# Patient Record
Sex: FEMALE | Race: WHITE | Employment: FULL TIME | URBAN - METROPOLITAN AREA
[De-identification: names, ages, dates, MRNs, and addresses within clinical notes are randomized per-mention and may not be internally consistent; named-entity substitution may affect disease eponyms.]

---

## 2021-10-09 ENCOUNTER — HOSPITAL ENCOUNTER (EMERGENCY)
Age: 21
Discharge: HOME | End: 2021-10-09
Attending: EMERGENCY MEDICINE
Payer: COMMERCIAL

## 2021-10-09 ENCOUNTER — APPOINTMENT (OUTPATIENT)
Dept: ULTRASOUND IMAGING | Age: 21
End: 2021-10-09
Attending: NURSE PRACTITIONER
Payer: COMMERCIAL

## 2021-10-09 VITALS
RESPIRATION RATE: 16 BRPM | HEIGHT: 70 IN | SYSTOLIC BLOOD PRESSURE: 112 MMHG | OXYGEN SATURATION: 97 % | HEART RATE: 88 BPM | DIASTOLIC BLOOD PRESSURE: 68 MMHG | WEIGHT: 176 LBS | TEMPERATURE: 98 F | BODY MASS INDEX: 25.2 KG/M2

## 2021-10-09 DIAGNOSIS — O12.03 SWELLING OF LOWER EXTREMITY DURING PREGNANCY IN THIRD TRIMESTER: ICD-10-CM

## 2021-10-09 DIAGNOSIS — M54.31 SCIATICA OF RIGHT SIDE: Primary | ICD-10-CM

## 2021-10-09 PROCEDURE — 99284 EMERGENCY DEPT VISIT MOD MDM: CPT | Performed by: EMERGENCY MEDICINE

## 2021-10-09 PROCEDURE — 93971 EXTREMITY STUDY: CPT | Performed by: NURSE PRACTITIONER

## 2021-10-09 RX ORDER — PRENATAL VIT/IRON FUM/FOLIC AC 27MG-0.8MG
1 TABLET ORAL DAILY
COMMUNITY

## 2021-10-09 NOTE — ED INITIAL ASSESSMENT (HPI)
Pt states one week of right thigh numbness and right sciatic pain, today noted spider veins to right upper calf, sent here for eval from OB, 31 weeks preg,  Denies abd pain or vaginal bleeding

## 2021-10-09 NOTE — ED PROVIDER NOTES
Patient Seen in: [facility] Emergency Department In [place]      History   Patient presents with:  Deep Vein Thrombosis    Stated Complaint: NUMBNESS RIGHT LEG TO KNEE. \"THE VEIN IS REALLY WEIRD. \" SENT BY HER OB TO CHECK*    Subjective:    This is a 21-y for rash. Allergic/Immunologic: Negative for environmental allergies. Neurological: Positive for numbness. Negative for syncope, weakness and headaches. Hematological: Does not bruise/bleed easily.        Positive for stated complaint: NUMBNESS RIGHT refill takes less than 2 seconds. Findings: No rash. Neurological:      Mental Status: She is alert and oriented to person, place, and time.    Psychiatric:         Mood and Affect: Mood normal.         Behavior: Behavior normal.               ED Cou

## 2021-11-17 ENCOUNTER — APPOINTMENT (OUTPATIENT)
Dept: ULTRASOUND IMAGING | Facility: HOSPITAL | Age: 21
End: 2021-11-17
Attending: OBSTETRICS & GYNECOLOGY
Payer: COMMERCIAL

## 2021-11-17 ENCOUNTER — HOSPITAL ENCOUNTER (OUTPATIENT)
Facility: HOSPITAL | Age: 21
Setting detail: OBSERVATION
Discharge: HOME | End: 2021-11-17
Attending: OBSTETRICS & GYNECOLOGY | Admitting: OBSTETRICS & GYNECOLOGY
Payer: COMMERCIAL

## 2021-11-17 VITALS
HEART RATE: 96 BPM | SYSTOLIC BLOOD PRESSURE: 110 MMHG | HEIGHT: 70 IN | BODY MASS INDEX: 26.48 KG/M2 | RESPIRATION RATE: 16 BRPM | WEIGHT: 185 LBS | DIASTOLIC BLOOD PRESSURE: 75 MMHG

## 2021-11-17 PROBLEM — Z34.90 PREGNANCY: Status: ACTIVE | Noted: 2021-11-17

## 2021-11-17 PROBLEM — Z34.90 PREGNANCY (HCC): Status: ACTIVE | Noted: 2021-11-17

## 2021-11-17 PROCEDURE — 59025 FETAL NON-STRESS TEST: CPT

## 2021-11-17 PROCEDURE — 76815 OB US LIMITED FETUS(S): CPT | Performed by: OBSTETRICS & GYNECOLOGY

## 2021-11-17 PROCEDURE — 99214 OFFICE O/P EST MOD 30 MIN: CPT

## 2021-11-17 RX ORDER — MELATONIN
325
COMMUNITY

## 2021-11-17 NOTE — PROGRESS NOTES
Pt is a 19year old female admitted to TRG4/TRG4-A. Pt is  36w2d intra-uterine pregnancy. Patient presents with:  R/o Rom  Pt states she had a regularly schedule OB appt this afternoon w/ [facility].  Upon arriving home and using the restroom she noticed s

## 2021-11-17 NOTE — NST
nstNonstress Test   Patient: Arturo Lamas    Gestation: 36w2d    NST:       Variability: Moderate           Accelerations: Yes           Decelerations: None            Baseline: 135 BPM           Uterine Irritability: No           Contractions: Irregu

## 2021-12-19 ENCOUNTER — HOSPITAL ENCOUNTER (OUTPATIENT)
Facility: HOSPITAL | Age: 21
Setting detail: OBSERVATION
Discharge: HOME | End: 2021-12-19
Attending: STUDENT IN AN ORGANIZED HEALTH CARE EDUCATION/TRAINING PROGRAM | Admitting: STUDENT IN AN ORGANIZED HEALTH CARE EDUCATION/TRAINING PROGRAM
Payer: COMMERCIAL

## 2021-12-19 VITALS
HEIGHT: 70 IN | TEMPERATURE: 97 F | RESPIRATION RATE: 16 BRPM | BODY MASS INDEX: 27.92 KG/M2 | WEIGHT: 195 LBS | SYSTOLIC BLOOD PRESSURE: 120 MMHG | DIASTOLIC BLOOD PRESSURE: 78 MMHG | HEART RATE: 89 BPM

## 2021-12-19 PROCEDURE — 4A0HXCZ MEASUREMENT OF PRODUCTS OF CONCEPTION, CARDIAC RATE, EXTERNAL APPROACH: ICD-10-PCS | Performed by: STUDENT IN AN ORGANIZED HEALTH CARE EDUCATION/TRAINING PROGRAM

## 2021-12-19 PROCEDURE — 59025 FETAL NON-STRESS TEST: CPT

## 2021-12-19 PROCEDURE — 99214 OFFICE O/P EST MOD 30 MIN: CPT

## 2021-12-20 ENCOUNTER — HOSPITAL ENCOUNTER (INPATIENT)
Facility: HOSPITAL | Age: 21
LOS: 3 days | Discharge: HOME | End: 2021-12-23
Attending: OBSTETRICS & GYNECOLOGY | Admitting: OBSTETRICS & GYNECOLOGY
Payer: COMMERCIAL

## 2021-12-20 ENCOUNTER — APPOINTMENT (OUTPATIENT)
Dept: OBGYN CLINIC | Facility: HOSPITAL | Age: 21
End: 2021-12-20
Payer: COMMERCIAL

## 2021-12-20 PROCEDURE — 85025 COMPLETE CBC W/AUTO DIFF WBC: CPT | Performed by: OBSTETRICS & GYNECOLOGY

## 2021-12-20 PROCEDURE — 86901 BLOOD TYPING SEROLOGIC RH(D): CPT | Performed by: OBSTETRICS & GYNECOLOGY

## 2021-12-20 PROCEDURE — 3E0P7GC INTRODUCTION OF OTHER THERAPEUTIC SUBSTANCE INTO FEMALE REPRODUCTIVE, VIA NATURAL OR ARTIFICIAL OPENING: ICD-10-PCS | Performed by: STUDENT IN AN ORGANIZED HEALTH CARE EDUCATION/TRAINING PROGRAM

## 2021-12-20 PROCEDURE — 82728 ASSAY OF FERRITIN: CPT | Performed by: STUDENT IN AN ORGANIZED HEALTH CARE EDUCATION/TRAINING PROGRAM

## 2021-12-20 PROCEDURE — 86850 RBC ANTIBODY SCREEN: CPT | Performed by: OBSTETRICS & GYNECOLOGY

## 2021-12-20 PROCEDURE — 86780 TREPONEMA PALLIDUM: CPT | Performed by: OBSTETRICS & GYNECOLOGY

## 2021-12-20 PROCEDURE — 86900 BLOOD TYPING SEROLOGIC ABO: CPT | Performed by: OBSTETRICS & GYNECOLOGY

## 2021-12-20 RX ORDER — ZOLPIDEM TARTRATE 5 MG/1
5 TABLET ORAL NIGHTLY PRN
Status: DISCONTINUED | OUTPATIENT
Start: 2021-12-20 | End: 2021-12-21

## 2021-12-20 RX ORDER — TERBUTALINE SULFATE 1 MG/ML
0.25 INJECTION, SOLUTION SUBCUTANEOUS AS NEEDED
Status: DISCONTINUED | OUTPATIENT
Start: 2021-12-20 | End: 2021-12-21

## 2021-12-20 RX ORDER — ONDANSETRON 2 MG/ML
4 INJECTION INTRAMUSCULAR; INTRAVENOUS EVERY 6 HOURS PRN
Status: DISCONTINUED | OUTPATIENT
Start: 2021-12-20 | End: 2021-12-21

## 2021-12-20 RX ORDER — IBUPROFEN 600 MG/1
600 TABLET ORAL EVERY 6 HOURS PRN
Status: DISCONTINUED | OUTPATIENT
Start: 2021-12-20 | End: 2021-12-21

## 2021-12-20 RX ORDER — SODIUM CHLORIDE, SODIUM LACTATE, POTASSIUM CHLORIDE, CALCIUM CHLORIDE 600; 310; 30; 20 MG/100ML; MG/100ML; MG/100ML; MG/100ML
INJECTION, SOLUTION INTRAVENOUS CONTINUOUS
Status: DISCONTINUED | OUTPATIENT
Start: 2021-12-20 | End: 2021-12-21

## 2021-12-20 RX ORDER — TRISODIUM CITRATE DIHYDRATE AND CITRIC ACID MONOHYDRATE 500; 334 MG/5ML; MG/5ML
30 SOLUTION ORAL AS NEEDED
Status: DISCONTINUED | OUTPATIENT
Start: 2021-12-20 | End: 2021-12-21

## 2021-12-20 RX ORDER — ACETAMINOPHEN 500 MG
500 TABLET ORAL EVERY 6 HOURS PRN
Status: DISCONTINUED | OUTPATIENT
Start: 2021-12-20 | End: 2021-12-21

## 2021-12-20 RX ORDER — AMMONIA INHALANTS 0.04 G/.3ML
0.3 INHALANT RESPIRATORY (INHALATION) AS NEEDED
Status: DISCONTINUED | OUTPATIENT
Start: 2021-12-20 | End: 2021-12-21

## 2021-12-20 RX ORDER — DEXTROSE, SODIUM CHLORIDE, SODIUM LACTATE, POTASSIUM CHLORIDE, AND CALCIUM CHLORIDE 5; .6; .31; .03; .02 G/100ML; G/100ML; G/100ML; G/100ML; G/100ML
INJECTION, SOLUTION INTRAVENOUS AS NEEDED
Status: DISCONTINUED | OUTPATIENT
Start: 2021-12-20 | End: 2021-12-21

## 2021-12-20 NOTE — PROGRESS NOTES
POC discussed with pt, pt verb understanding and agreeable. Pt requesting to walk halls.  Pt up to ambulate

## 2021-12-20 NOTE — NST
Nonstress Test   Patient: Abida Gomez    Gestation: 40w6d    NST:       Variability: Moderate           Accelerations: Yes           Decelerations: None            Baseline: 135 BPM           Uterine Irritability: No           Contractions: Irreg

## 2021-12-20 NOTE — PROGRESS NOTES
Pt  EDC 19 presents to L&D with c/o irregular cramping throughout the day, pt states possible leaking of clear fluid since yesterday apx 1100. Pt denies vag bleeding, states + fetal movement. EFM tested and applied.  Pt labels verified per pt and

## 2021-12-20 NOTE — PROGRESS NOTES
Discharge instructions given to pt and discussed. Pt states no questions at this time, verb understanding and agreeable to POC. Pt refused wheelchair, escorted out by this RN with instructions in hand, family at side in stable condition.

## 2021-12-21 ENCOUNTER — ANESTHESIA EVENT (OUTPATIENT)
Dept: OBGYN UNIT | Facility: HOSPITAL | Age: 21
End: 2021-12-21
Payer: COMMERCIAL

## 2021-12-21 ENCOUNTER — ANESTHESIA (OUTPATIENT)
Dept: OBGYN UNIT | Facility: HOSPITAL | Age: 21
End: 2021-12-21
Payer: COMMERCIAL

## 2021-12-21 PROCEDURE — 0KQM0ZZ REPAIR PERINEUM MUSCLE, OPEN APPROACH: ICD-10-PCS | Performed by: STUDENT IN AN ORGANIZED HEALTH CARE EDUCATION/TRAINING PROGRAM

## 2021-12-21 RX ORDER — METHYLERGONOVINE MALEATE 0.2 MG/ML
INJECTION INTRAVENOUS
Status: COMPLETED
Start: 2021-12-21 | End: 2021-12-21

## 2021-12-21 RX ORDER — NALBUPHINE HCL 10 MG/ML
2.5 AMPUL (ML) INJECTION
Status: DISCONTINUED | OUTPATIENT
Start: 2021-12-21 | End: 2021-12-21

## 2021-12-21 RX ORDER — METHYLERGONOVINE MALEATE 0.2 MG/ML
0.2 INJECTION INTRAVENOUS ONCE
Status: COMPLETED | OUTPATIENT
Start: 2021-12-21 | End: 2021-12-21

## 2021-12-21 RX ORDER — IBUPROFEN 600 MG/1
600 TABLET ORAL EVERY 6 HOURS
Status: DISCONTINUED | OUTPATIENT
Start: 2021-12-21 | End: 2021-12-23

## 2021-12-21 RX ORDER — CARBOPROST TROMETHAMINE 250 UG/ML
INJECTION, SOLUTION INTRAMUSCULAR
Status: COMPLETED
Start: 2021-12-21 | End: 2021-12-21

## 2021-12-21 RX ORDER — DOCUSATE SODIUM 100 MG/1
100 CAPSULE, LIQUID FILLED ORAL
Status: DISCONTINUED | OUTPATIENT
Start: 2021-12-21 | End: 2021-12-23

## 2021-12-21 RX ORDER — AMPICILLIN 2 G/1
INJECTION, POWDER, FOR SOLUTION INTRAVENOUS
Status: COMPLETED
Start: 2021-12-21 | End: 2021-12-21

## 2021-12-21 RX ORDER — BISACODYL 10 MG
10 SUPPOSITORY, RECTAL RECTAL ONCE AS NEEDED
Status: DISCONTINUED | OUTPATIENT
Start: 2021-12-21 | End: 2021-12-23

## 2021-12-21 RX ORDER — SIMETHICONE 80 MG
80 TABLET,CHEWABLE ORAL 3 TIMES DAILY PRN
Status: DISCONTINUED | OUTPATIENT
Start: 2021-12-21 | End: 2021-12-23

## 2021-12-21 RX ORDER — ACETAMINOPHEN 325 MG/1
650 TABLET ORAL EVERY 6 HOURS PRN
Status: DISCONTINUED | OUTPATIENT
Start: 2021-12-21 | End: 2021-12-23

## 2021-12-21 RX ORDER — 0.9 % SODIUM CHLORIDE 0.9 %
INTRAVENOUS SOLUTION INTRAVENOUS
Status: COMPLETED
Start: 2021-12-21 | End: 2021-12-21

## 2021-12-21 RX ORDER — BUPIVACAINE HCL/0.9 % NACL/PF 0.25 %
5 PLASTIC BAG, INJECTION (ML) EPIDURAL AS NEEDED
Status: DISCONTINUED | OUTPATIENT
Start: 2021-12-21 | End: 2021-12-21

## 2021-12-21 RX ORDER — AMPICILLIN 1 G/1
INJECTION, POWDER, FOR SOLUTION INTRAMUSCULAR; INTRAVENOUS
Status: DISPENSED
Start: 2021-12-21 | End: 2021-12-21

## 2021-12-21 RX ORDER — CARBOPROST TROMETHAMINE 250 UG/ML
250 INJECTION, SOLUTION INTRAMUSCULAR ONCE
Status: COMPLETED | OUTPATIENT
Start: 2021-12-21 | End: 2021-12-21

## 2021-12-21 NOTE — ANESTHESIA PROCEDURE NOTES
Labor Analgesia  Performed by: Jared Mcgrath MD  Authorized by: Jared Mcgrath MD       General Information and Staff    Start Time:  12/21/2021 7:53 AM  End Time:  12/21/2021 8:00 AM  Anesthesiologist:  Jared Mcgrath MD  Performed by:   Anesthesiologist  Pa

## 2021-12-21 NOTE — L&D DELIVERY NOTE
Edita Henry [[id number]]    Labor Events     labor?: No   steroids?: None  Cervical ripening type: Misoprostol  Antibiotics received during labor?: Yes  Antibiotics (enter # doses in comment): ampicillin  Rupture date/time: 2021 9484 pale Acrocyanotic Completely pink    Heart rate Absent <100 bpm >100 bpm    Reflex irritability No response Grimace Cry or active withdrawal    Muscle tone Limp Some flexion Active motion    Respiratory effort Absent Weak cry; hypoventilation Good, crying

## 2021-12-21 NOTE — PROGRESS NOTES
Pt is a 19year old female admitted to 108/108-A. Patient presents with:  Scheduled Induction     Pt is  41w0d intra-uterine pregnancy. History obtained, consents signed. Oriented to room, staff, and plan of care.

## 2021-12-21 NOTE — ANESTHESIA PREPROCEDURE EVALUATION
PRE-OP EVALUATION    [facility]    Admit Diagnosis: preg state  Pregnancy    Pre-op Diagnosis: * No pre-op diagnosis entered *        Anesthesia Procedure: LABOR ANALGESIA    * No surgeons found in log *    Pre-op vitals reviewed. Intravenous, Q4H        Outpatient Medications:   ferrous sulfate 325 (65 FE) MG Oral Tab EC, Take 325 mg by mouth daily with breakfast., Disp: , Rfl: , Past Month at Unknown time  Prenatal 27-0.8 MG Oral Tab, Take 1 tablet by mouth daily. , Disp: , Rfl: , on Admission:  **None**

## 2021-12-21 NOTE — PROGRESS NOTES
Pt admitted to room 2210 in stable condition. IV in place. ID bands matched with Angelo Gordon. Educational materials reviewed and placed at bedside. All questions answered. Call light within reach. Pt comfortable and resting.

## 2021-12-21 NOTE — H&P
[facility]  Obstetrics Admission History & Physical    Nicolle Deep Patient Status:  Inpatient    2000 MRN [id number]   [facility] 2SW-J Attending Ximena Brooke Day # 1 PCP No primary care provider on f Smoking status: Never Smoker      Smokeless tobacco: Never Used    Alcohol use: Never      FAMILY HISTORY:   Family History   Problem Relation Age of Onset   • Cancer Mother 48        Hodgkin's Lymphoma        GYNECOLOGICAL HISTORY:        Menarche 9yo.  [place] Phillip

## 2021-12-21 NOTE — PROCEDURES
Vaginal Delivery Note          [facility] Patient Status:  Inpatient    2000 MRN [id number]   Location [address] [facility] [address], [facility] [place] 647 Day stop pushing, and shoulder dystocia was called. The emergency light was pressed for additional staff to enter the room. Manual exam was performed to the posterior arm, and there was felt to be adequate room.  The maternal legs were in McRobert's position, a

## 2021-12-21 NOTE — ANESTHESIA PROCEDURE NOTES
Labor Analgesia  Performed by: Ashu Abarca MD  Authorized by: Ashu Abarca MD       General Information and Staff    Start Time:  12/21/2021 7:16 AM  End Time:  12/21/2021 7:24 AM  Anesthesiologist:  Ashu Abarca MD  Performed by:   Anesthesiologist  Pa

## 2021-12-22 PROCEDURE — 85025 COMPLETE CBC W/AUTO DIFF WBC: CPT | Performed by: STUDENT IN AN ORGANIZED HEALTH CARE EDUCATION/TRAINING PROGRAM

## 2021-12-22 NOTE — CM/SW NOTE
met with [place] ( patient) and Tiffany Leo (FOB) to review insurance for infant. Patient has her parents insurance so infant will be added to [employer] [facility] PPO plan. Couple needed list of PCP for infant from Jas[employer]'s insurance plan.

## 2021-12-22 NOTE — PROGRESS NOTES
[facility] Vaginal Delivery Progress Note    Grace Medeiros Patient Status:  Inpatient    2000 MRN [id number]   [facility] 2SW-J Attending Braden Garcia Day # 2 PCP No primary care provider o

## 2021-12-22 NOTE — PROGRESS NOTES
Labor Analgesia Follow Up Note    Patient underwent epidural anesthesia for labor analgesia,    Placenta Date/Time: 12/21/2021 11:38 AM    Delivery Date/Time[de-identified] 12/21/2021  11:29 AM    /57 (BP Location: Left arm)   Pulse 85   Temp 97.9 °F (36.6 °C) (O

## 2021-12-23 VITALS
RESPIRATION RATE: 18 BRPM | SYSTOLIC BLOOD PRESSURE: 114 MMHG | TEMPERATURE: 98 F | HEART RATE: 79 BPM | DIASTOLIC BLOOD PRESSURE: 76 MMHG

## 2021-12-23 PROBLEM — Z34.90 PREGNANCY: Status: RESOLVED | Noted: 2021-11-17 | Resolved: 2021-12-23

## 2021-12-23 PROBLEM — Z34.90 PREGNANCY (HCC): Status: RESOLVED | Noted: 2021-11-17 | Resolved: 2021-12-23

## 2021-12-23 NOTE — DISCHARGE SUMMARY
Obstetrical Discharge Summary    Patient Name:  Puma Blanco  MRN:                 [id number]  YOB: 2000    Primary OB Clinician: Leeann Chowdhury    Admission Date: 12/20/2021    Discharge Date:  12/23/2021    Admissi NEPRELIM 5.45 11.22*   WBC 7.8 13.4*   .0 127.0*         Discharge condition:  D/C'ed home in stable condition    Discharge diet:  general    Discharge medications:       Medication List      CONTINUE taking these medications    ferrous sulfate 32

## 2021-12-23 NOTE — PROGRESS NOTES
[address] [facility] Vaginal Delivery Progress Note    Chacho Lee Patient Status:  Inpatient    2000 MRN [id number]   [facility] 2SW-J Attending Jacquie Brewster Day # 3 PCP No primary care provider o

## 2021-12-23 NOTE — PROGRESS NOTES
Half dose IV venofer given, IV infiltrated during administratiopn.    IV removed  Cold compress applied

## 2021-12-26 ENCOUNTER — TELEPHONE (OUTPATIENT)
Dept: OBGYN UNIT | Facility: HOSPITAL | Age: 21
End: 2021-12-26

## 2021-12-28 ENCOUNTER — TELEPHONE (OUTPATIENT)
Dept: OBGYN UNIT | Facility: HOSPITAL | Age: 21
End: 2021-12-28

## 2021-12-28 NOTE — PROGRESS NOTES
12/28/21 1427   Cradle Call Follow up   Cradle call follow up date 12/28/21   Follow up needed Completed   Hypertension or Preeclampsia during pregnancy or delivery No   Outgoing Cradle Call completed: Mom reports that she and infant are doing well.

## 2022-05-10 ENCOUNTER — OFFICE VISIT (OUTPATIENT)
Dept: INTERNAL MEDICINE | Age: 22
End: 2022-05-10

## 2022-05-10 ENCOUNTER — TELEPHONE (OUTPATIENT)
Dept: FAMILY MEDICINE | Age: 22
End: 2022-05-10

## 2022-05-10 VITALS
RESPIRATION RATE: 16 BRPM | WEIGHT: 148 LBS | SYSTOLIC BLOOD PRESSURE: 90 MMHG | HEART RATE: 84 BPM | HEIGHT: 70 IN | DIASTOLIC BLOOD PRESSURE: 74 MMHG | BODY MASS INDEX: 21.19 KG/M2 | TEMPERATURE: 97.8 F

## 2022-05-10 DIAGNOSIS — J06.9 UPPER RESPIRATORY TRACT INFECTION, UNSPECIFIED TYPE: Primary | ICD-10-CM

## 2022-05-10 PROCEDURE — U0003 INFECTIOUS AGENT DETECTION BY NUCLEIC ACID (DNA OR RNA); SEVERE ACUTE RESPIRATORY SYNDROME CORONAVIRUS 2 (SARS-COV-2) (CORONAVIRUS DISEASE [COVID-19]), AMPLIFIED PROBE TECHNIQUE, MAKING USE OF HIGH THROUGHPUT TECHNOLOGIES AS DESCRIBED BY CMS-2020-01-R: HCPCS | Performed by: INTERNAL MEDICINE

## 2022-05-10 PROCEDURE — PSEU10635 2019 NOVEL CORONAVIRUS (SARS-COV-2): Performed by: CLINICAL MEDICAL LABORATORY

## 2022-05-10 PROCEDURE — U0005 INFEC AGEN DETEC AMPLI PROBE: HCPCS | Performed by: CLINICAL MEDICAL LABORATORY

## 2022-05-10 PROCEDURE — U0003 INFECTIOUS AGENT DETECTION BY NUCLEIC ACID (DNA OR RNA); SEVERE ACUTE RESPIRATORY SYNDROME CORONAVIRUS 2 (SARS-COV-2) (CORONAVIRUS DISEASE [COVID-19]), AMPLIFIED PROBE TECHNIQUE, MAKING USE OF HIGH THROUGHPUT TECHNOLOGIES AS DESCRIBED BY CMS-2020-01-R: HCPCS | Performed by: CLINICAL MEDICAL LABORATORY

## 2022-05-10 PROCEDURE — U0005 INFEC AGEN DETEC AMPLI PROBE: HCPCS | Performed by: INTERNAL MEDICINE

## 2022-05-10 PROCEDURE — 99203 OFFICE O/P NEW LOW 30 MIN: CPT | Performed by: INTERNAL MEDICINE

## 2022-05-10 RX ORDER — ACETAMINOPHEN AND CODEINE PHOSPHATE 120; 12 MG/5ML; MG/5ML
1 SOLUTION ORAL DAILY
COMMUNITY
Start: 2022-03-15

## 2022-05-10 RX ORDER — AMOXICILLIN 500 MG/1
500 CAPSULE ORAL 3 TIMES DAILY
Qty: 30 CAPSULE | Refills: 0 | Status: SHIPPED | OUTPATIENT
Start: 2022-05-10

## 2022-05-10 ASSESSMENT — PATIENT HEALTH QUESTIONNAIRE - PHQ9
CLINICAL INTERPRETATION OF PHQ2 SCORE: NO FURTHER SCREENING NEEDED
SUM OF ALL RESPONSES TO PHQ9 QUESTIONS 1 AND 2: 0
2. FEELING DOWN, DEPRESSED OR HOPELESS: NOT AT ALL
1. LITTLE INTEREST OR PLEASURE IN DOING THINGS: NOT AT ALL
SUM OF ALL RESPONSES TO PHQ9 QUESTIONS 1 AND 2: 0

## 2022-05-10 ASSESSMENT — PAIN SCALES - GENERAL: PAINLEVEL: 0

## 2022-05-11 ENCOUNTER — TELEPHONE (OUTPATIENT)
Dept: INTERNAL MEDICINE | Age: 22
End: 2022-05-11

## 2022-05-11 ENCOUNTER — LAB REQUISITION (OUTPATIENT)
Dept: LAB | Age: 22
End: 2022-05-11

## 2022-05-11 DIAGNOSIS — J06.9 ACUTE UPPER RESPIRATORY INFECTION, UNSPECIFIED: ICD-10-CM

## 2022-05-11 LAB
SARS-COV-2 RNA RESP QL NAA+PROBE: NOT DETECTED
SARS-COV-2 RNA RESP QL NAA+PROBE: NOT DETECTED
SERVICE CMNT-IMP: NORMAL

## 2023-07-11 LAB — HIV RESULT OB: NEGATIVE

## 2023-07-12 LAB — HEPATITIS B SURFACE ANTIGEN OB: NEGATIVE

## 2023-12-03 ENCOUNTER — HOSPITAL ENCOUNTER (OUTPATIENT)
Age: 23
Discharge: HOME | End: 2023-12-03
Payer: COMMERCIAL

## 2023-12-03 VITALS
OXYGEN SATURATION: 98 % | HEART RATE: 95 BPM | RESPIRATION RATE: 16 BRPM | TEMPERATURE: 98 F | SYSTOLIC BLOOD PRESSURE: 106 MMHG | DIASTOLIC BLOOD PRESSURE: 61 MMHG

## 2023-12-03 DIAGNOSIS — J06.9 VIRAL UPPER RESPIRATORY TRACT INFECTION: Primary | ICD-10-CM

## 2023-12-03 RX ORDER — ACETAMINOPHEN 325 MG/1
650 TABLET ORAL ONCE
Status: COMPLETED | OUTPATIENT
Start: 2023-12-03 | End: 2023-12-03

## 2023-12-03 NOTE — ED INITIAL ASSESSMENT (HPI)
Tues Pt started with chest congestion, productive cough, runny nose, slight nausea    Denies: fevers

## 2023-12-13 LAB — HIV RESULT OB: NEGATIVE

## 2023-12-26 ENCOUNTER — HOSPITAL ENCOUNTER (OUTPATIENT)
Age: 23
Discharge: HOME | End: 2023-12-26
Payer: COMMERCIAL

## 2023-12-26 VITALS
HEART RATE: 110 BPM | DIASTOLIC BLOOD PRESSURE: 80 MMHG | OXYGEN SATURATION: 96 % | BODY MASS INDEX: 24.14 KG/M2 | SYSTOLIC BLOOD PRESSURE: 117 MMHG | WEIGHT: 163 LBS | RESPIRATION RATE: 16 BRPM | TEMPERATURE: 98 F | HEIGHT: 69 IN

## 2023-12-26 DIAGNOSIS — H65.03 NON-RECURRENT ACUTE SEROUS OTITIS MEDIA OF BOTH EARS: Primary | ICD-10-CM

## 2023-12-26 PROCEDURE — 99203 OFFICE O/P NEW LOW 30 MIN: CPT | Performed by: NURSE PRACTITIONER

## 2023-12-26 RX ORDER — AMOXICILLIN 875 MG/1
875 TABLET, COATED ORAL 2 TIMES DAILY
Qty: 20 TABLET | Refills: 0 | Status: SHIPPED | OUTPATIENT
Start: 2023-12-26 | End: 2024-01-05

## 2023-12-26 NOTE — DISCHARGE INSTRUCTIONS
Follow-up with your primary care physician for all of your healthcare needs  Increase fluids keep hydrated  Tylenol for fever and pain  Take the amoxicillin twice daily for 10 days  Over-the-counter Delsym or Robitussin can be helpful for your cough while pregnant  Over-the-counter Flonase daily can also be helpful  Return to the emergency room for symptoms or concerns

## 2023-12-26 NOTE — ED INITIAL ASSESSMENT (HPI)
Pt with c/o uri symptoms for the past 3 weeks. Pt states symptoms resolved briefly but then returned. Pt c/o productive cough and intermittent shortness of breath. Pt c/o fatigue.   Pt is 30 weeks pregnant

## 2024-02-05 LAB — STREP GP B CULT OB: NEGATIVE

## 2024-02-27 ENCOUNTER — ANESTHESIA EVENT (OUTPATIENT)
Dept: OBGYN UNIT | Facility: HOSPITAL | Age: 24
End: 2024-02-27
Payer: COMMERCIAL

## 2024-02-27 ENCOUNTER — TELEPHONE (OUTPATIENT)
Dept: OBGYN UNIT | Facility: HOSPITAL | Age: 24
End: 2024-02-27

## 2024-02-27 ENCOUNTER — HOSPITAL ENCOUNTER (INPATIENT)
Facility: HOSPITAL | Age: 24
LOS: 2 days | Discharge: HOME | End: 2024-02-29
Attending: OBSTETRICS & GYNECOLOGY | Admitting: OBSTETRICS & GYNECOLOGY
Payer: COMMERCIAL

## 2024-02-27 ENCOUNTER — ANESTHESIA (OUTPATIENT)
Dept: OBGYN UNIT | Facility: HOSPITAL | Age: 24
End: 2024-02-27
Payer: COMMERCIAL

## 2024-02-27 PROBLEM — Z34.90 PREGNANCY (HCC): Status: ACTIVE | Noted: 2024-02-27

## 2024-02-27 LAB
ANTIBODY SCREEN: NEGATIVE
BASOPHILS # BLD AUTO: 0.03 X10(3) UL (ref 0–0.2)
BASOPHILS NFR BLD AUTO: 0.4 %
EOSINOPHIL # BLD AUTO: 0.03 X10(3) UL (ref 0–0.7)
EOSINOPHIL NFR BLD AUTO: 0.4 %
ERYTHROCYTE [DISTWIDTH] IN BLOOD BY AUTOMATED COUNT: 13.6 %
HCT VFR BLD AUTO: 36.1 %
HGB BLD-MCNC: 12 G/DL
IMM GRANULOCYTES # BLD AUTO: 0.09 X10(3) UL (ref 0–1)
IMM GRANULOCYTES NFR BLD: 1.1 %
LYMPHOCYTES # BLD AUTO: 1.73 X10(3) UL (ref 1–4)
LYMPHOCYTES NFR BLD AUTO: 20.6 %
MCH RBC QN AUTO: 28.4 PG (ref 26–34)
MCHC RBC AUTO-ENTMCNC: 33.2 G/DL (ref 31–37)
MCV RBC AUTO: 85.5 FL
MONOCYTES # BLD AUTO: 0.57 X10(3) UL (ref 0.1–1)
MONOCYTES NFR BLD AUTO: 6.8 %
NEUTROPHILS # BLD AUTO: 5.95 X10 (3) UL (ref 1.5–7.7)
NEUTROPHILS # BLD AUTO: 5.95 X10(3) UL (ref 1.5–7.7)
NEUTROPHILS NFR BLD AUTO: 70.7 %
PLATELET # BLD AUTO: 176 10(3)UL (ref 150–450)
RBC # BLD AUTO: 4.22 X10(6)UL
RH BLOOD TYPE: POSITIVE
T PALLIDUM AB SER QL IA: NONREACTIVE
WBC # BLD AUTO: 8.4 X10(3) UL (ref 4–11)

## 2024-02-27 PROCEDURE — 86780 TREPONEMA PALLIDUM: CPT | Performed by: OBSTETRICS & GYNECOLOGY

## 2024-02-27 PROCEDURE — 86900 BLOOD TYPING SEROLOGIC ABO: CPT | Performed by: OBSTETRICS & GYNECOLOGY

## 2024-02-27 PROCEDURE — 0HQ9XZZ REPAIR PERINEUM SKIN, EXTERNAL APPROACH: ICD-10-PCS | Performed by: OBSTETRICS & GYNECOLOGY

## 2024-02-27 PROCEDURE — 99214 OFFICE O/P EST MOD 30 MIN: CPT

## 2024-02-27 PROCEDURE — 85025 COMPLETE CBC W/AUTO DIFF WBC: CPT | Performed by: OBSTETRICS & GYNECOLOGY

## 2024-02-27 PROCEDURE — 86850 RBC ANTIBODY SCREEN: CPT | Performed by: OBSTETRICS & GYNECOLOGY

## 2024-02-27 PROCEDURE — 86901 BLOOD TYPING SEROLOGIC RH(D): CPT | Performed by: OBSTETRICS & GYNECOLOGY

## 2024-02-27 RX ORDER — ACETAMINOPHEN 500 MG
1000 TABLET ORAL EVERY 6 HOURS PRN
Status: DISCONTINUED | OUTPATIENT
Start: 2024-02-27 | End: 2024-02-27

## 2024-02-27 RX ORDER — IBUPROFEN 600 MG/1
600 TABLET ORAL ONCE AS NEEDED
Status: DISCONTINUED | OUTPATIENT
Start: 2024-02-27 | End: 2024-02-27

## 2024-02-27 RX ORDER — BUPIVACAINE HCL/0.9 % NACL/PF 0.25 %
5 PLASTIC BAG, INJECTION (ML) EPIDURAL AS NEEDED
Status: DISCONTINUED | OUTPATIENT
Start: 2024-02-27 | End: 2024-02-27

## 2024-02-27 RX ORDER — BISACODYL 10 MG
10 SUPPOSITORY, RECTAL RECTAL ONCE AS NEEDED
Status: DISCONTINUED | OUTPATIENT
Start: 2024-02-27 | End: 2024-02-29

## 2024-02-27 RX ORDER — DOCUSATE SODIUM 100 MG/1
100 CAPSULE, LIQUID FILLED ORAL
Status: DISCONTINUED | OUTPATIENT
Start: 2024-02-27 | End: 2024-02-29

## 2024-02-27 RX ORDER — NALBUPHINE HYDROCHLORIDE 10 MG/ML
2.5 INJECTION, SOLUTION INTRAMUSCULAR; INTRAVENOUS; SUBCUTANEOUS
Status: DISCONTINUED | OUTPATIENT
Start: 2024-02-27 | End: 2024-02-27

## 2024-02-27 RX ORDER — SIMETHICONE 80 MG
80 TABLET,CHEWABLE ORAL 3 TIMES DAILY PRN
Status: DISCONTINUED | OUTPATIENT
Start: 2024-02-27 | End: 2024-02-29

## 2024-02-27 RX ORDER — ONDANSETRON 2 MG/ML
4 INJECTION INTRAMUSCULAR; INTRAVENOUS EVERY 6 HOURS PRN
Status: DISCONTINUED | OUTPATIENT
Start: 2024-02-27 | End: 2024-02-27

## 2024-02-27 RX ORDER — ACETAMINOPHEN 500 MG
1000 TABLET ORAL EVERY 6 HOURS PRN
Status: DISCONTINUED | OUTPATIENT
Start: 2024-02-27 | End: 2024-02-29

## 2024-02-27 RX ORDER — ACETAMINOPHEN 500 MG
500 TABLET ORAL EVERY 6 HOURS PRN
Status: DISCONTINUED | OUTPATIENT
Start: 2024-02-27 | End: 2024-02-29

## 2024-02-27 RX ORDER — TERBUTALINE SULFATE 1 MG/ML
0.25 INJECTION, SOLUTION SUBCUTANEOUS AS NEEDED
Status: DISCONTINUED | OUTPATIENT
Start: 2024-02-27 | End: 2024-02-27

## 2024-02-27 RX ORDER — IBUPROFEN 600 MG/1
600 TABLET ORAL EVERY 6 HOURS
Status: DISCONTINUED | OUTPATIENT
Start: 2024-02-27 | End: 2024-02-29

## 2024-02-27 RX ORDER — METHYLERGONOVINE MALEATE 0.2 MG/ML
0.2 INJECTION INTRAVENOUS ONCE
Status: DISCONTINUED | OUTPATIENT
Start: 2024-02-27 | End: 2024-02-29

## 2024-02-27 RX ORDER — DEXTROSE, SODIUM CHLORIDE, SODIUM LACTATE, POTASSIUM CHLORIDE, AND CALCIUM CHLORIDE 5; .6; .31; .03; .02 G/100ML; G/100ML; G/100ML; G/100ML; G/100ML
INJECTION, SOLUTION INTRAVENOUS AS NEEDED
Status: DISCONTINUED | OUTPATIENT
Start: 2024-02-27 | End: 2024-02-27

## 2024-02-27 RX ORDER — ACETAMINOPHEN 500 MG
500 TABLET ORAL EVERY 6 HOURS PRN
Status: DISCONTINUED | OUTPATIENT
Start: 2024-02-27 | End: 2024-02-27

## 2024-02-27 RX ORDER — METHYLERGONOVINE MALEATE 0.2 MG/ML
INJECTION INTRAVENOUS
Status: COMPLETED
Start: 2024-02-27 | End: 2024-02-27

## 2024-02-27 RX ORDER — CITRIC ACID/SODIUM CITRATE 334-500MG
30 SOLUTION, ORAL ORAL AS NEEDED
Status: DISCONTINUED | OUTPATIENT
Start: 2024-02-27 | End: 2024-02-27

## 2024-02-27 RX ORDER — LIDOCAINE HYDROCHLORIDE 10 MG/ML
INJECTION, SOLUTION EPIDURAL; INFILTRATION; INTRACAUDAL; PERINEURAL AS NEEDED
Status: DISCONTINUED | OUTPATIENT
Start: 2024-02-27 | End: 2024-02-27 | Stop reason: SURG

## 2024-02-27 RX ORDER — SODIUM CHLORIDE, SODIUM LACTATE, POTASSIUM CHLORIDE, CALCIUM CHLORIDE 600; 310; 30; 20 MG/100ML; MG/100ML; MG/100ML; MG/100ML
INJECTION, SOLUTION INTRAVENOUS CONTINUOUS
Status: DISCONTINUED | OUTPATIENT
Start: 2024-02-27 | End: 2024-02-27

## 2024-02-27 RX ADMIN — LIDOCAINE HYDROCHLORIDE 25 MG: 10 INJECTION, SOLUTION EPIDURAL; INFILTRATION; INTRACAUDAL; PERINEURAL at 20:55:00

## 2024-02-27 NOTE — PROGRESS NOTES
Pt is a 24 year old female admitted to TRG3/TRG3-A.     Chief Complaint   Patient presents with    R/o Rom     Patient states that she believes that her water broke at 1400. Patient has some irregular ctx's that she can feel. Positive FM       Pt is  39w4d intra-uterine pregnancy.  History obtained, consents signed. Oriented to room, staff, and plan of care.

## 2024-02-27 NOTE — PLAN OF CARE
Problem: BIRTH - VAGINAL/ SECTION  Goal: Fetal and maternal status remain reassuring during the birth process  Description: INTERVENTIONS:  - Monitor vital signs  - Monitor fetal heart rate  - Monitor uterine activity  - Monitor labor progression (vaginal delivery)  - DVT prophylaxis (C/S delivery)  - Surgical antibiotic prophylaxis (C/S delivery)  Outcome: Progressing     Problem: PAIN - ADULT  Goal: Verbalizes/displays adequate comfort level or patient's stated pain goal  Description: INTERVENTIONS:  - Encourage pt to monitor pain and request assistance  - Assess pain using appropriate pain scale  - Administer analgesics based on type and severity of pain and evaluate response  - Implement non-pharmacological measures as appropriate and evaluate response  - Consider cultural and social influences on pain and pain management  - Manage/alleviate anxiety  - Utilize distraction and/or relaxation techniques  - Monitor for opioid side effects  - Notify MD/LIP if interventions unsuccessful or patient reports new pain  - Anticipate increased pain with activity and pre-medicate as appropriate  Outcome: Progressing     Problem: ANXIETY  Goal: Will report anxiety at manageable levels  Description: INTERVENTIONS:  - Administer medication as ordered  - Teach and rehearse alternative coping skills  - Provide emotional support with 1:1 interaction with staff  Outcome: Progressing     Problem: Patient/Family Goals  Goal: Patient/Family Long Term Goal  Description: Patient's Long Term Goal: to have adequate pain management     Interventions:  -   - See additional Care Plan goals for specific interventions  Outcome: Progressing  Goal: Patient/Family Short Term Goal  Description: Patient's Short Term Goal: to have an uncomplicated vaginal delivery     Interventions:   -   - See additional Care Plan goals for specific interventions  Outcome: Progressing

## 2024-02-27 NOTE — H&P
[facility]    PATIENT'S NAME: BIANCA HALL   ATTENDING PHYSICIAN: Eusebia Peralta M.D.   PATIENT ACCOUNT#:   [id number]    LOCATION:  [address] [place] [address]  MEDICAL RECORD #:   [id number]       YOB: 2000  ADMISSION DATE:       2024    HISTORY AND PHYSICAL EXAMINATION    HISTORY OF PRESENT ILLNESS:  She is a 24-year-old female.  She is a  2, para 1-0-0-1, who is at 39-4/7 weeks, who came in today to Labor and Delivery with spontaneous rupture of membranes at approximately 2 o'clock, clear fluid.  She was checked in triage and noted to be grossly ruptured and dilated to 1 cm, 70%, and -2 with a cephalic presentation.  Her group B strep was done recently and was negative.  She is not having any contractions at this time.  She was planning for an induction next week and is here in labor now.  Her prenatal care has been good.  Her prenatal labs are as follows.  Her initial labs were done 2023 and showed blood type A positive, antibody screen negative.  Hemoglobin 12.7, hematocrit 38.9, platelets of 250.  RPR nonreactive.  Hepatitis B surface antigen negative.  HIV negative.  Her rubella is immune.  Her chlamydia and gonorrhea are negative.  Also on that same date, she has further 1-hour Glucola early in the pregnancy in the first trimester was 85.  Hemoglobin A1c was 5.3.  Her other labs are as follows.  She had a vitamin D level done in the first trimester, 28.3.  She had repeat labs done again in the third trimester.  Her 1-hour Glucola on  was 141, and she underwent a 3-hour Glucola on , which was within normal limits.  Her vitamin D was 20 at that time.  Further on, she had an HIV in the third trimester that was negative as well.  Her group B strep was done on , and the results were no group B strep that was grown.  Of note also is that she did had an NT that was done at the appropriate time during the pregnancy.  Declined noninvasive prenatal  testing, quad and Horizon testing was offered to her.  Currently, she states good fetal movement and not noticing any contractions.      PAST MEDICAL HISTORY:  History of anxiety, not currently on medication.  Vitamin D deficiency.  As mentioned, she is on replacement.  Also, she has a history of anemia during this pregnancy, on iron.    MEDICATIONS:  Her prenatal vitamins once a day; her vitamin D3, 5000 international units per day; and then her iron once a day.      ALLERGIES:  She has an allergy to sulfa antibiotics and to trimethoprim. She has a rash to the trimethoprim.    REPRODUCTIVE HISTORY:  Reviewed here.  She had menarche at age 11, every 28 days for 5 days.  She had her first delivery in  at 41 weeks.  She had an 11-pound infant male vaginally.  This was a shoulder dystocia that was reduced with Bobby and with suprapubic pressure.      SOCIAL HISTORY:  No smoking, no drinking, no alcohol use during pregnancy.    PHYSICAL EXAMINATION:    GENERAL:  Alert and oriented times x3.    VITAL SIGNS:  She is 5 feet 10 inches tall and weighs currently 180 pounds.  Vital signs are stable.  Her strip is reactive.  Irregular contractions that are noted.    LUNGS:  Nonlabored breathing.    ABDOMEN:  Gravid.  Estimated weight of about 8 pounds.    PELVIC:  Exam is 1 cm, 70%, -2.  Grossly ruptured per the RN.    EXTREMITIES:  Minimal edema.  No evidence of DVT.    ASSESSMENT:    1.   Intrauterine pregnancy at 39-1/2 weeks.  2.   Spontaneous rupture of membranes.  3.   History of prior macrosomic infant with shoulder dystocia.  4.   Anemia, on iron replacement.  5.   Vitamin D deficiency.  6.   History of prior postpartum hemorrhage from prior delivery.      PLAN:  For this labor is to enhance labor with Pitocin as needed.  Anticipate .  The patient is requesting no epidural for this labor.     Dictated By Eusebia Peralta M.D.  d: 2024 16:43:10  t: 2024 17:11:17  [facility] [id number]  /

## 2024-02-28 LAB
BASOPHILS # BLD AUTO: 0.02 X10(3) UL (ref 0–0.2)
BASOPHILS NFR BLD AUTO: 0.2 %
EOSINOPHIL # BLD AUTO: 0.04 X10(3) UL (ref 0–0.7)
EOSINOPHIL NFR BLD AUTO: 0.4 %
ERYTHROCYTE [DISTWIDTH] IN BLOOD BY AUTOMATED COUNT: 13.6 %
HCT VFR BLD AUTO: 35.3 %
HGB BLD-MCNC: 11.6 G/DL
IMM GRANULOCYTES # BLD AUTO: 0.09 X10(3) UL (ref 0–1)
IMM GRANULOCYTES NFR BLD: 1 %
LYMPHOCYTES # BLD AUTO: 1.72 X10(3) UL (ref 1–4)
LYMPHOCYTES NFR BLD AUTO: 18.9 %
MCH RBC QN AUTO: 28.3 PG (ref 26–34)
MCHC RBC AUTO-ENTMCNC: 32.9 G/DL (ref 31–37)
MCV RBC AUTO: 86.1 FL
MONOCYTES # BLD AUTO: 0.64 X10(3) UL (ref 0.1–1)
MONOCYTES NFR BLD AUTO: 7 %
NEUTROPHILS # BLD AUTO: 6.6 X10 (3) UL (ref 1.5–7.7)
NEUTROPHILS # BLD AUTO: 6.6 X10(3) UL (ref 1.5–7.7)
NEUTROPHILS NFR BLD AUTO: 72.5 %
PLATELET # BLD AUTO: 154 10(3)UL (ref 150–450)
RBC # BLD AUTO: 4.1 X10(6)UL
WBC # BLD AUTO: 9.1 X10(3) UL (ref 4–11)

## 2024-02-28 PROCEDURE — 85025 COMPLETE CBC W/AUTO DIFF WBC: CPT | Performed by: OBSTETRICS & GYNECOLOGY

## 2024-02-28 NOTE — L&D DELIVERY NOTE
[facility]    PATIENT'S NAME: BIANCA HALL   ATTENDING PHYSICIAN: Eusebia Peralta M.D.   PATIENT ACCOUNT #: [id number] LOCATION:  [address] [place] [address]   MEDICAL RECORD #: [id number] YOB: 2000   ADMISSION DATE: 2024 DELIVERY DATE: 2024     DELIVERY NOTE    She is a 24-year-old female,  2, para 1-0-0-1 at 39-4/7 weeks, who came in with spontaneous rupture of membranes at 2 p.m. this afternoon with clear fluid.  She was noted to be 1 cm on admission, 70%, and -2 with irregular contractions.  She progressed in labor and had a small amount of Pitocin started.  As she was not david regularly, her Pitocin was increased to 8 milliunits maximum and then she did progress.  She received an epidural when she was approximately 6 cm and then rapidly became complete with a strong urge to push.  She pushed for approximately 1 set of pushes and brought the baby's head down, and the head was delivered in a controlled manner in an NOAH position, and the shoulders were easily delivered.  Mouth and nose were suctioned, and the baby was placed on the maternal abdomen.  This was a female infant, 7 pounds 10 ounces, with Apgars of 8 and 9.  Delayed cord clamping of 30 seconds was performed.  Placenta was delivered spontaneously completely and intact, 3-vessel cord.  Her uterus did calm down.  Her cervix was intact.  Her rectum was intact.  She sustained a primary labial laceration on the right and then a primary first-degree perineal laceration repaired with 3-0 chromic suture.  Her QBL was not calculated at the time of this dictation, but sponge and needle counts were correct.  She did receive an extra dose of Methergine due to some uterine atony after delivery.  However, her estimated blood loss visually appeared to be less than 500.  We will await the QBL.     Dictated By Eusebia Peralta M.D.  d: 2024 23:17:16  t: 2024 02:12:49  [facility] [id number]  /

## 2024-02-28 NOTE — PROGRESS NOTES
Patient admitted via wheelchair.  ID bands verified.  Oriented to room.  Safety precautions are initiated.  Bed in low position.  Teaching initiated.  Call light within reach.

## 2024-02-28 NOTE — PROGRESS NOTES
Patient up to bathroom with assist x 2.  Voided 100 ml. Patient transferred to mother/baby room 1115 per wheelchair in stable condition with baby and personal belongings.  Accompanied by significant other and staff.  Report given to mother/baby RN.

## 2024-02-28 NOTE — PLAN OF CARE
Problem: BIRTH - VAGINAL/ SECTION  Goal: Fetal and maternal status remain reassuring during the birth process  Description: INTERVENTIONS:  - Monitor vital signs  - Monitor fetal heart rate  - Monitor uterine activity  - Monitor labor progression (vaginal delivery)  - DVT prophylaxis (C/S delivery)  - Surgical antibiotic prophylaxis (C/S delivery)  2024 by Sabrina Quick RN  Outcome: Completed  2024 by Sabrina Quick RN  Outcome: Progressing     Problem: PAIN - ADULT  Goal: Verbalizes/displays adequate comfort level or patient's stated pain goal  Description: INTERVENTIONS:  - Encourage pt to monitor pain and request assistance  - Assess pain using appropriate pain scale  - Administer analgesics based on type and severity of pain and evaluate response  - Implement non-pharmacological measures as appropriate and evaluate response  - Consider cultural and social influences on pain and pain management  - Manage/alleviate anxiety  - Utilize distraction and/or relaxation techniques  - Monitor for opioid side effects  - Notify MD/LIP if interventions unsuccessful or patient reports new pain  - Anticipate increased pain with activity and pre-medicate as appropriate  2024 by Sabrina Quick RN  Outcome: Completed  2024 by Sabrina Quick RN  Outcome: Progressing     Problem: ANXIETY  Goal: Will report anxiety at manageable levels  Description: INTERVENTIONS:  - Administer medication as ordered  - Teach and rehearse alternative coping skills  - Provide emotional support with 1:1 interaction with staff  2024 by Sabrina Quick RN  Outcome: Completed  2024 by Sabrina Quick RN  Outcome: Progressing     Problem: Patient/Family Goals  Goal: Patient/Family Long Term Goal  Description: Patient's Long Term Goal: to have adequate pain management     Interventions:  -   - See additional Care Plan goals for specific interventions  2024  by Sabrina Quick, RN  Outcome: Completed  2/27/2024 1958 by Sabrina Quick, RN  Outcome: Progressing  Goal: Patient/Family Short Term Goal  Description: Patient's Short Term Goal: to have an uncomplicated vaginal delivery     Interventions:   -   - See additional Care Plan goals for specific interventions  2/28/2024 0010 by Sabrina Quick, RN  Outcome: Completed  2/27/2024 1958 by Sabrina Quick, RN  Outcome: Progressing

## 2024-02-28 NOTE — PROGRESS NOTES
PPD1    S: doing well, lochia Is moderate, breastfeeding, pain is well managed with po meds, she has no emotional concerns  O: /74 (BP Location: Right arm)   Pulse 65   Temp 97.9 °F (36.6 °C) (Oral)   Resp 16   Wt 180 lb (81.6 kg)   SpO2 97%   Breastfeeding Yes   BMI 26.58 kg/m²   Recent Labs   Lab 24  1631 24  0903   RBC 4.22 4.10   HGB 12.0 11.6*   HCT 36.1 35.3   MCV 85.5 86.1   MCH 28.4 28.3   MCHC 33.2 32.9   RDW 13.6 13.6   NEPRELIM 5.95 6.60   WBC 8.4 9.1   .0 154.0         General: alert and oriented x3  Respiratory: non labored breathing  Abdomen: soft and nt  Uterus; firm nt  Ext: no edema, no evidence of dvt      A: s/p       Anemia      Stable  P; probable discharge to home tomorrow

## 2024-02-28 NOTE — ANESTHESIA PROCEDURE NOTES
Labor Analgesia    Date/Time: 2/27/2024 8:55 PM    Performed by: Sina Rodriguez MD  Authorized by: Sina Rodriguez MD      General Information and Staff    Start Time:  2/27/2024 8:55 PM  End Time:  2/27/2024 9:02 PM  Anesthesiologist:  Sina Rodriguez MD  Performed by:  Anesthesiologist  Patient Location:  OB  Site Identification: surface landmarks    Reason for Block: labor epidural    Preanesthetic Checklist: patient identified, IV checked, risks and benefits discussed, monitors and equipment checked, pre-op evaluation, timeout performed, IV bolus, anesthesia consent and sterile technique used      Procedure Details    Patient Position:  Sitting  Prep: ChloraPrep    Monitoring:  Heart rate and continuous pulse ox  Approach:  Midline    Epidural Needle    Injection Technique:   Needle Type:  Tuohy  Needle Gauge:  17 G  Needle Length:  3.375 in  Needle Insertion Depth:  6  Location:  L3-4    Spinal Needle      Catheter    Catheter Type:  End hole  Catheter Size:  19 G  Catheter at Skin Depth:  10  Test Dose:  Negative    Assessment    Sensory Level:  T8    Additional Comments       Lidocaine 1%- 3ml infiltrated subcutaneously with 25 g needle  Lidocaine 1.5% with 1:200 K epi 3ml +2ml given via epidural for test dose  Fentanyl 100mcg given per epidural.

## 2024-02-28 NOTE — ANESTHESIA PREPROCEDURE EVALUATION
PRE-OP EVALUATION    Patient Name: Sparkle Pemberton    Admit Diagnosis: pregnancy  Pregnancy (HCC)    Pre-op Diagnosis: * No pre-op diagnosis entered *        Anesthesia Procedure: LABOR ANALGESIA    * No surgeons found in log *    Pre-op vitals reviewed.  Temp: 98.5 °F (36.9 °C)  Pulse: 80  BP: 120/89  SpO2: 97 %  Body mass index is 26.58 kg/m².    Current medications reviewed.  Hospital Medications:   lactated ringers infusion   Intravenous Continuous    dextrose in lactated ringers 5% infusion   Intravenous PRN    lactated ringers IV bolus 500 mL  500 mL Intravenous PRN    acetaminophen (Tylenol Extra Strength) tab 500 mg  500 mg Oral Q6H PRN    acetaminophen (Tylenol Extra Strength) tab 1,000 mg  1,000 mg Oral Q6H PRN    ibuprofen (Motrin) tab 600 mg  600 mg Oral Once PRN    ondansetron (Zofran) 4 MG/2ML injection 4 mg  4 mg Intravenous Q6H PRN    oxyTOCIN in sodium chloride 0.9% (Pitocin) 30 Units/500mL infusion premix  62.5-900 mell-units/min Intravenous Continuous    terbutaline (Brethine) 1 MG/ML injection 0.25 mg  0.25 mg Subcutaneous PRN    sodium citrate-citric acid (Bicitra) 500-334 MG/5ML oral solution 30 mL  30 mL Oral PRN    oxyTOCIN in sodium chloride 0.9% (Pitocin) 30 Units/500mL infusion premix  0.5-20 mell-units/min Intravenous Continuous    [COMPLETED] lactated ringers IV bolus 1,000 mL  1,000 mL Intravenous Once    fentaNYL-bupivacaine 2 mcg/mL-0.125% in sodium chloride 0.9% 100 mL EPIDURAL infusion premix  12 mL/hr Epidural Continuous    [COMPLETED] fentaNYL (Sublimaze) 50 mcg/mL injection 100 mcg  100 mcg Epidural Once    EPHEDrine (PF) 25 MG/5 ML injection 5 mg  5 mg Intravenous PRN    nalbuphine (Nubain) 10 mg/mL injection 2.5 mg  2.5 mg Intravenous Q15 Min PRN    lidocaine PF (Xylocaine-MPF) 1% injection   Infiltration PRN       Outpatient Medications:     Medications Prior to Admission   Medication Sig Dispense Refill Last Dose    Prenatal 27-0.8 MG Oral Tab Take 1 tablet by mouth  daily.   2024    [] amoxicillin 875 MG Oral Tab Take 1 tablet (875 mg total) by mouth 2 (two) times daily for 10 days. 20 tablet 0        Allergies: Bactrim [sulfamethoxazole w/trimethoprim]      Anesthesia Evaluation    Patient summary reviewed.    Anesthetic Complications           GI/Hepatic/Renal    Negative GI/hepatic/renal ROS.                             Cardiovascular                                                       Endo/Other    Negative endo/other ROS.                              Pulmonary      (+) asthma                     Neuro/Psych    Negative neuro/psych ROS.                          Patient Active Problem List:     Shoulder dystocia during labor and delivery (HCC)     Postpartum hemorrhage (HCC)     Pregnancy (HCC)            Past Surgical History:   Procedure Laterality Date    APPENDECTOMY      FRACTURE SURGERY  2019    ankle    OTHER      exploratory laparotomy at 12yo for abdominal pain    WISDOM TEETH REMOVED       Social History     Socioeconomic History    Marital status: Single   Tobacco Use    Smoking status: Never    Smokeless tobacco: Never   Substance and Sexual Activity    Alcohol use: Never    Drug use: Never     History   Drug Use Unknown     Available pre-op labs reviewed.  Lab Results   Component Value Date    WBC 8.4 2024    RBC 4.22 2024    HGB 12.0 2024    HCT 36.1 2024    MCV 85.5 2024    MCH 28.4 2024    MCHC 33.2 2024    RDW 13.6 2024    .0 2024               Airway      Mallampati: II  Mouth opening: >3 FB  TM distance: > 6 cm  Neck ROM: full Cardiovascular    Cardiovascular exam normal.         Dental    Dentition appears grossly intact         Pulmonary    Pulmonary exam normal.                 Other findings              ASA: 2   Plan: epidural  NPO status verified and patient meets guidelines.        Comment: The risks and benefits of neuraxial anesthesia have been explained to the  patient as outlined in the anesthesia consent.  Risks include but are not limited to: bleeding, infection, nerve damage, paresthesias, headaches, and incomplete block.  The consent was signed without further questions.       Plan/risks discussed with: patient                Present on Admission:  **None**

## 2024-02-28 NOTE — L&D DELIVERY NOTE
Niecy Girl [[id number]]      Labor Events     labor?: No   steroids?: None  Antibiotics received during labor?: No  Rupture date/time: 2024     Rupture type: SROM  Fluid color: Clear  Labor type: Spontaneous Onset of Labor  Augmentation: Oxytocin  Indications for augmentation: Ineffective Contraction Pattern  Intrapartum & labor complications: None       Labor Event Times    Labor onset date/time: 2024  Dilation complete date/time: 2024  Start pushing date/time: 2024       [place] Presentation    Presentation: Vertex  Position: Occiput Anterior       Operative Delivery    Operative Vaginal Delivery: No                      Shoulder Dystocia    Shoulder Dystocia: No             Anesthesia    Method: Epidural              [place] Delivery      Head delivery date/time: 2024 21:28:06   Delivery date/time:  24 21:28:14   Delivery type: Normal spontaneous vaginal delivery    Details:     Delivery location: delivery room       Delivery Providers    Delivering Clinician: Eusebia Peralta MD, MD   Delivery personnel:  Provider Role   Catie Black RN Baby Nurse   Sabrina Quick RN Delivery Nurse             Cord    No data filed       [place] Measurements      Weight: 3470 g 7 lb 10.4 oz      Head circum.: 34 cm Chest circum.: 33.5 cm          Abdominal circum.: 32 cm           Placenta    Date/time: 2024  Removal: Spontaneous  Appearance: Intact  Disposition: held for future pathology       Apgars    No data filed       Skin to Skin    No data filed       Vaginal Count    Initial count RN: Anya Ireland RN  Initial count Tech: [place]ro[place], Angelicapenny Rodriguez   Sharps    Initial counts 11   0    Final counts 11   1    Final count RN: Sabrina Quick RN  Final count MD: Eusebia Peralta MD, MD       Delivery (Maternal)    Episiotomy: None  Perineal lacerations: 1st Repaired?: Yes     Labial laceration: right Repaired?: Yes      Vaginal laceration?: No      Cervical laceration?: No    Clitoral laceration?: No

## 2024-02-28 NOTE — PROGRESS NOTES
Labor Analgesia Follow Up Note    Patient underwent epidural anesthesia for labor analgesia,    Placenta Date/Time: 2/27/2024  9:30 PM     Delivery Date/Time:: 2/27/2024   9:28 PM     /74 (BP Location: Right arm)   Pulse 65   Temp 97.9 °F (36.6 °C) (Oral)   Resp 16   Wt 81.6 kg (180 lb)   SpO2 97%   Breastfeeding Yes   BMI 26.58 kg/m²     Assessment:  Patient seen and no apparent anesthesia related complications.    Thank you for asking us to participate in the care of your patient.

## 2024-02-29 VITALS
BODY MASS INDEX: 27 KG/M2 | OXYGEN SATURATION: 97 % | DIASTOLIC BLOOD PRESSURE: 71 MMHG | HEART RATE: 62 BPM | WEIGHT: 180 LBS | RESPIRATION RATE: 18 BRPM | TEMPERATURE: 98 F | SYSTOLIC BLOOD PRESSURE: 108 MMHG

## 2024-02-29 NOTE — PLAN OF CARE
Problem: POSTPARTUM  Goal: Long Term Goal:Experiences normal postpartum course  Description: INTERVENTIONS:  - Assess and monitor vital signs and lab values.  - Assess fundus and lochia.  - Provide ice/sitz baths for perineum discomfort.  - Monitor healing of incision/episiotomy/laceration, and assess for signs and symptoms of infection and hematoma.  - Assess bladder function and monitor for bladder distention.  - Provide/instruct/assist with pericare as needed.  - Provide VTE prophylaxis as needed.  - Monitor bowel function.  - Encourage ambulation and provide assistance as needed.  - Assess and monitor emotional status and provide social service/psych resources as needed.  - Utilize standard precautions and use personal protective equipment as indicated. Ensure aseptic care of all intravenous lines and invasive tubes/drains.  - Obtain immunization and exposure to communicable diseases history.  Outcome: Completed  Goal: Optimize infant feeding at the breast  Description: INTERVENTIONS:  - Initiate breast feeding within first hour after birth.   - Monitor effectiveness of current breast feeding efforts.  - Assess support systems available to mother/family.  - Identify cultural beliefs/practices regarding lactation, letdown techniques, maternal food preferences.  - Assess mother's knowledge and previous experience with breast feeding.  - Provide information as needed about early infant feeding cues (e.g., rooting, lip smacking, sucking fingers/hand) versus late cue of crying.  - Discuss/demonstrate breast feeding aids (e.g., infant sling, nursing footstool/pillows, and breast pumps).  - Encourage mother/other family members to express feelings/concerns, and actively listen.  - Educate father/SO about benefits of breast feeding and how to manage common lactation challenges.  - Recommend avoidance of specific medications or substances incompatible with breast feeding.  - Assess and monitor for signs of nipple  pain/trauma.  - Instruct and provide assistance with proper latch.  - Review techniques for milk expression (breast pumping) and storage of breast milk. Provide pumping equipment/supplies, instructions and assistance, as needed.  - Encourage rooming-in and breast feeding on demand.  - Encourage skin-to-skin contact.  - Provide LC support as needed.  - Assess for and manage engorgement.  - Provide breast feeding education handouts and information on community breast feeding support.   Outcome: Completed  Goal: Appropriate maternal -  bonding  Description: INTERVENTIONS:  - Assess caregiver- interactions.  - Assess caregiver's emotional status and coping mechanisms.  - Encourage caregiver to participate in  daily care.  - Assess support systems available to mother/family.  - Provide /case management support as needed.  Outcome: Completed

## 2024-02-29 NOTE — PROGRESS NOTES
[facility]  Post-Partum Vaginal Delivery Progress Note    Sparkle Pemberton Patient Status:  Inpatient    2000 MRN [id number]   Location [facility] 1SW-J Attending Eusebia Peralta MD, MD   Hosp Day # 2 PCP No primary care provider on file.     SUBJECTIVE:    Postpartum Day 2 s/p vaginal delivery    The patient without complaints.  Pain is well controlled with current medications.     Baby is breast fed.      OBJECTIVE:    Vital signs in last 24 hours:  Temp:  [97.8 °F (36.6 °C)] (P) 97.8 °F (36.6 °C)  Pulse:  [65] 65  Resp:  [16] (P) 16  BP: (121)/(76) 121/76        Data Reviewed:  Lab Results   Component Value Date    WBC 9.1 2024    HGB 11.6 2024    HCT 35.3 2024    .0 2024       ASSESSMENT:   Post Partum Day # 2 S/P Normal Spontaneous Vaginal Delivery    PLAN:  Continue present management.  Discharge instructions given.  Follow up six weeks. Call as needed.  Home today.      Ilda Smart MD  2024  8:03 AM

## 2024-02-29 NOTE — PROGRESS NOTES
REVIEWED DISCHARGE TEACHING WITH PATIENT. VERBALIZES UNDERSTANDING. PATIENT STATES FEELS CONFIDENT CARING FOR SELF AND BABY AT HOME. HUGS AND KISSES TAGS REMOVED. PATIENT WILL FOLLOW UP WITH PEDS AND OB AS DIRECTED.

## 2024-02-29 NOTE — DISCHARGE SUMMARY
[facility]  Discharge Summary    Sparkle Pemberton Patient Status:  Inpatient    2000 MRN [id number]   Location [facility] 1SW-J Attending Eusebia Peralta MD, MD   Hosp Day # 2 PCP No primary care provider on file.     Date of Admission: 2024    Date of Discharge: 2024    Admitting Diagnosis: pregnancy  Pregnancy (HCC)    Discharge Diagnosis:   Patient Active Problem List   Diagnosis    Shoulder dystocia during labor and delivery (HCC)    Postpartum hemorrhage (HCC)    Pregnancy (HCC)       Reason for Admission: Pregnancy at term in labor    Procedures:      Hospital Course:Sparkle Pemberton 23 yo came in for contractions. She underwent  with out problems. Please refer to delivery note for more details. She did well postpartum. Pain and bleeding were control. She was ambulating and tolerating diet. She was afebrile for the entire admission. She was discharged home in stable condition on PPD#2    Labs:   Lab Results   Component Value Date    WBC 9.1 2024    HGB 11.6 2024    HCT 35.3 2024    .0 2024       Complications: none    Disposition: Home with instructions    DIET: General    Activity: slightly restricted    Discharge Condition: Stable    Follow Up: 6 weeks for PP visit, pt to call for appointment    Discharge Medications:   Current Discharge Medication List        CONTINUE these medications which have NOT CHANGED    Details   Prenatal 27-0.8 MG Oral Tab Take 1 tablet by mouth daily.           STOP taking these medications       amoxicillin 875 MG Oral Tab                Ilda Smart MD  2024  8:15 AM

## 2024-03-04 ENCOUNTER — TELEPHONE (OUTPATIENT)
Dept: OBGYN UNIT | Facility: HOSPITAL | Age: 24
End: 2024-03-04

## (undated) NOTE — LETTER
[facility]  [address]  Dept: [phone]  Dept Fax: [phone]         December 3, 2023    Patient: Caden Lamb   YOB: 2000   Date of Visit: 12/3/2023       To Whom It May Concern:    Didi Gan was seen and treated in our Immediate Care department on 12/3/2023. Please excuse from work this week. If you have any questions or concerns, please don't hesitate to call. Encounter Provider(s):     Omar Hathaway     2:37 PM  12/3/2023

## (undated) NOTE — LETTER
Date & Time: 12/26/2023, 2:19 PM  Patient: Vignesh Wagner  Encounter Provider(s):    ABDELRAHMAN Beltran       To Whom It May Concern:    Eugenio Santana was seen and treated in our department on 12/26/2023. She can return to work on 12/28/2023.     If you have any questions or concerns, please do not hesitate to call.        _____________________________  Physician/APC Signature